# Patient Record
Sex: FEMALE | Race: WHITE | NOT HISPANIC OR LATINO | URBAN - METROPOLITAN AREA
[De-identification: names, ages, dates, MRNs, and addresses within clinical notes are randomized per-mention and may not be internally consistent; named-entity substitution may affect disease eponyms.]

---

## 2017-03-27 ENCOUNTER — LAB REQUISITION (OUTPATIENT)
Dept: LAB | Facility: HOSPITAL | Age: 82
End: 2017-03-27
Payer: MEDICARE

## 2017-03-27 DIAGNOSIS — Z52.5 CORNEA DONOR: ICD-10-CM

## 2017-03-27 PROCEDURE — 87205 SMEAR GRAM STAIN: CPT | Performed by: OPHTHALMOLOGY

## 2017-03-27 PROCEDURE — 87075 CULTR BACTERIA EXCEPT BLOOD: CPT | Performed by: OPHTHALMOLOGY

## 2017-03-27 PROCEDURE — 87070 CULTURE OTHR SPECIMN AEROBIC: CPT | Performed by: OPHTHALMOLOGY

## 2017-03-27 PROCEDURE — 87102 FUNGUS ISOLATION CULTURE: CPT | Performed by: OPHTHALMOLOGY

## 2017-03-30 LAB
BACTERIA EYE AEROBE CULT: NO GROWTH
BACTERIA SPEC ANAEROBE CULT: NO GROWTH
GRAM STN SPEC: NORMAL

## 2017-04-05 ENCOUNTER — ALLSCRIPTS OFFICE VISIT (OUTPATIENT)
Dept: OTHER | Facility: OTHER | Age: 82
End: 2017-04-05

## 2017-04-05 ENCOUNTER — TRANSCRIBE ORDERS (OUTPATIENT)
Dept: ADMINISTRATIVE | Facility: HOSPITAL | Age: 82
End: 2017-04-05

## 2017-04-05 DIAGNOSIS — D64.9 ANEMIA, UNSPECIFIED: ICD-10-CM

## 2017-04-05 DIAGNOSIS — R91.8 LUNG MASS: ICD-10-CM

## 2017-04-05 DIAGNOSIS — C85.90 MALIGNANT LYMPHOMA, HISTIOCYTIC (HCC): Primary | ICD-10-CM

## 2017-04-27 DIAGNOSIS — C83.30 DIFFUSE LARGE B-CELL LYMPHOMA (HCC): ICD-10-CM

## 2017-04-27 DIAGNOSIS — R91.8 OTHER NONSPECIFIC ABNORMAL FINDING OF LUNG FIELD: ICD-10-CM

## 2017-04-27 DIAGNOSIS — D64.9 ANEMIA: ICD-10-CM

## 2017-04-27 DIAGNOSIS — B02.9 ZOSTER WITHOUT COMPLICATIONS: ICD-10-CM

## 2017-05-01 DIAGNOSIS — R91.8 OTHER NONSPECIFIC ABNORMAL FINDING OF LUNG FIELD: ICD-10-CM

## 2017-05-01 DIAGNOSIS — D64.9 ANEMIA: ICD-10-CM

## 2017-05-01 DIAGNOSIS — B02.9 ZOSTER WITHOUT COMPLICATIONS: ICD-10-CM

## 2017-05-01 DIAGNOSIS — C83.30 DIFFUSE LARGE B-CELL LYMPHOMA (HCC): ICD-10-CM

## 2017-05-01 LAB — FUNGUS SPEC CULT: NORMAL

## 2017-07-27 ENCOUNTER — GENERIC CONVERSION - ENCOUNTER (OUTPATIENT)
Dept: OTHER | Facility: OTHER | Age: 82
End: 2017-07-27

## 2018-01-09 NOTE — MISCELLANEOUS
Message     Recorded as Task   Date: 07/27/2017 11:52 AM, Created By: Nelly Aparicio   Task Name: Follow Up   Assigned To: Shala Cid   Regarding Patient: Rosie López, Status: Active   CommentLodeshaun Plum - 27 Jul 2017 11:52 AM     TASK CREATED  Caller: Self; General Medical Question; [phone] (Home)  pt is questioning where she is with everything? she has [facility] coming in but doesnt know about her status,follow up appt etc?[phone]   Devi Bland - 27 Jul 2017 12:58 PM     TASK EDITED  Called patient to schedule her for a f/u office visit with Dr Robert Aviles  Patient states she would like to hold off on making any appointments at this time as she has recently gotten out of the hospital and has been overwhelmed with outpatient appointments  Patient did not have her CT scan of the Abd/pelvis/chest done in April  Patient made aware to call the office to have the CT scan rescheduled and done before she reschedules her appointment  Active Problems    1  Anemia (285 9) (D64 9)   2  Cellulitis of right lower extremity (682 6) (L03 115)   3  Diffuse large B-cell lymphoma, unspecified site (202 80) (C83 30)   4  Herpes zoster (053 9) (B02 9)   5  Lung nodules (793 19) (R91 8)   6  Lymphoma, diffuse (202 80) (C85 90)   7  Open wound of right heel (892 0) (S91 301A)    Current Meds   1  Aspirin 81 MG TABS; Therapy: (Recorded:19Sep2014) to Recorded   2  Benadryl Itch Stopping 1-0 1 % External Cream; APPLY SPARINGLY TO AFFECTED   AREA(S) 3 TIMES A DAY; Therapy: [id number] to (Evaluate:15Apr2017)  Requested for: 05Apr2017; Last   Rx:05Apr2017 Ordered   3  Cephalexin 500 MG Oral Capsule; TAKE 1 CAPSULE EVERY 12 HOURS UNTIL GONE;   Therapy: [id number] to (Shayne Mckenzie)  Requested for: [id number]; Last   Rx:11Nov2015 Ordered   4  Daily Multi Oral Tablet; Therapy: (Recorded:19Sep2014) to Recorded   5  Januvia 50 MG Oral Tablet; Therapy: (Recorded:19Sep2014) to Recorded   6   Metoprolol Succinate ER 25 MG Oral Tablet Extended Release 24 Hour; Therapy: (Recorded:19Sep2014) to Recorded   7  Neurontin 300 MG Oral Capsule (Gabapentin); Therapy: (Recorded:19Sep2014) to Recorded   8  Norvasc 10 MG Oral Tablet (AmLODIPine Besylate); Therapy: (Recorded:19Sep2014) to Recorded   9  Triamcinolone Acetonide 0 1 % External Cream;   Therapy: (Recorded:19Sep2014) to Recorded   10  Tylenol Extra Strength 500 MG Oral Tablet; Therapy: (Recorded:19Sep2014) to Recorded   11  ValACYclovir HCl - 1 GM Oral Tablet; TAKE 1 TABLET DAILY; Therapy: [id number] to (Evaluate:12Apr2017)  Requested for: 05Apr2017; Last    Rx:05Apr2017 Ordered    Allergies    1  Cipro TABS   2  Compazine TABS    Signatures   Electronically signed by :  Brenda Ward RN; Jul 27 2017 12:58PM EST                       (Author)

## 2018-01-14 VITALS
RESPIRATION RATE: 18 BRPM | OXYGEN SATURATION: 100 % | TEMPERATURE: 97.6 F | HEART RATE: 52 BPM | SYSTOLIC BLOOD PRESSURE: 120 MMHG | BODY MASS INDEX: 22.5 KG/M2 | WEIGHT: 127 LBS | DIASTOLIC BLOOD PRESSURE: 60 MMHG | HEIGHT: 63 IN